# Patient Record
Sex: MALE | Race: OTHER | NOT HISPANIC OR LATINO | URBAN - METROPOLITAN AREA
[De-identification: names, ages, dates, MRNs, and addresses within clinical notes are randomized per-mention and may not be internally consistent; named-entity substitution may affect disease eponyms.]

---

## 2021-10-13 ENCOUNTER — EMERGENCY (EMERGENCY)
Facility: HOSPITAL | Age: 41
LOS: 1 days | Discharge: HOME | End: 2021-10-13
Attending: EMERGENCY MEDICINE | Admitting: EMERGENCY MEDICINE
Payer: MEDICAID

## 2021-10-13 VITALS
OXYGEN SATURATION: 97 % | TEMPERATURE: 98 F | WEIGHT: 175.05 LBS | SYSTOLIC BLOOD PRESSURE: 139 MMHG | RESPIRATION RATE: 20 BRPM | DIASTOLIC BLOOD PRESSURE: 71 MMHG | HEART RATE: 74 BPM

## 2021-10-13 PROCEDURE — 99285 EMERGENCY DEPT VISIT HI MDM: CPT

## 2021-10-13 RX ORDER — SODIUM CHLORIDE 9 MG/ML
1000 INJECTION INTRAMUSCULAR; INTRAVENOUS; SUBCUTANEOUS ONCE
Refills: 0 | Status: COMPLETED | OUTPATIENT
Start: 2021-10-13 | End: 2021-10-13

## 2021-10-13 NOTE — ED ADULT TRIAGE NOTE - CHIEF COMPLAINT QUOTE
35 yr old male c/o left flank pain radiating to the LLQ since today. Pt also reports brown urine since today. Denies NV, fever. FMH of renal cholic. 35 yr old male c/o left flank pain radiating to the LLQ since today. Pt also reports brown urine since today. Denies NV, fever. Took Motrin 400 @ 2230 before arrival to the ED. FMH of renal cholic.

## 2021-10-13 NOTE — ED ADULT NURSE NOTE - OBJECTIVE STATEMENT
Received Pt awake and alert, c/o left flank pain that started last night, worse today, now radiating to LLQ, also c/o dark urine. Pt wife gave Pt ibuprofen with no relief.

## 2021-10-13 NOTE — ED ADULT NURSE NOTE - NSICDXFAMILYHX_GEN_ALL_CORE_FT
FAMILY HISTORY:  Mother  Still living? Yes, Estimated age: Age Unknown  Family history of kidney stones, Age at diagnosis: Age Unknown

## 2021-10-13 NOTE — ED ADULT NURSE NOTE - CHIEF COMPLAINT QUOTE
35 yr old male c/o left flank pain radiating to the LLQ since today. Pt also reports brown urine since today. Denies NV, fever. FMH of renal cholic.

## 2021-10-14 VITALS
SYSTOLIC BLOOD PRESSURE: 138 MMHG | OXYGEN SATURATION: 97 % | TEMPERATURE: 98 F | HEART RATE: 72 BPM | DIASTOLIC BLOOD PRESSURE: 78 MMHG | RESPIRATION RATE: 18 BRPM

## 2021-10-14 LAB
ALBUMIN SERPL ELPH-MCNC: 3.8 G/DL — SIGNIFICANT CHANGE UP (ref 3.3–5)
ALP SERPL-CCNC: 50 U/L — SIGNIFICANT CHANGE UP (ref 40–120)
ALT FLD-CCNC: 102 U/L — HIGH (ref 12–78)
ANION GAP SERPL CALC-SCNC: 3 MMOL/L — LOW (ref 5–17)
APPEARANCE UR: CLEAR — SIGNIFICANT CHANGE UP
AST SERPL-CCNC: 32 U/L — SIGNIFICANT CHANGE UP (ref 15–37)
BACTERIA # UR AUTO: NEGATIVE — SIGNIFICANT CHANGE UP
BASOPHILS # BLD AUTO: 0.03 K/UL — SIGNIFICANT CHANGE UP (ref 0–0.2)
BASOPHILS NFR BLD AUTO: 0.3 % — SIGNIFICANT CHANGE UP (ref 0–2)
BILIRUB SERPL-MCNC: 0.7 MG/DL — SIGNIFICANT CHANGE UP (ref 0.2–1.2)
BILIRUB UR-MCNC: NEGATIVE — SIGNIFICANT CHANGE UP
BUN SERPL-MCNC: 8 MG/DL — SIGNIFICANT CHANGE UP (ref 7–23)
CALCIUM SERPL-MCNC: 8.7 MG/DL — SIGNIFICANT CHANGE UP (ref 8.5–10.1)
CHLORIDE SERPL-SCNC: 108 MMOL/L — SIGNIFICANT CHANGE UP (ref 96–108)
CO2 SERPL-SCNC: 29 MMOL/L — SIGNIFICANT CHANGE UP (ref 22–31)
COLOR SPEC: YELLOW — SIGNIFICANT CHANGE UP
CREAT SERPL-MCNC: 1.2 MG/DL — SIGNIFICANT CHANGE UP (ref 0.5–1.3)
DIFF PNL FLD: ABNORMAL
EOSINOPHIL # BLD AUTO: 0.11 K/UL — SIGNIFICANT CHANGE UP (ref 0–0.5)
EOSINOPHIL NFR BLD AUTO: 1.2 % — SIGNIFICANT CHANGE UP (ref 0–6)
EPI CELLS # UR: NEGATIVE — SIGNIFICANT CHANGE UP
GLUCOSE SERPL-MCNC: 146 MG/DL — HIGH (ref 70–99)
GLUCOSE UR QL: NEGATIVE — SIGNIFICANT CHANGE UP
HCT VFR BLD CALC: 45.2 % — SIGNIFICANT CHANGE UP (ref 39–50)
HGB BLD-MCNC: 14.7 G/DL — SIGNIFICANT CHANGE UP (ref 13–17)
IMM GRANULOCYTES NFR BLD AUTO: 0.3 % — SIGNIFICANT CHANGE UP (ref 0–1.5)
KETONES UR-MCNC: NEGATIVE — SIGNIFICANT CHANGE UP
LEUKOCYTE ESTERASE UR-ACNC: NEGATIVE — SIGNIFICANT CHANGE UP
LYMPHOCYTES # BLD AUTO: 2.18 K/UL — SIGNIFICANT CHANGE UP (ref 1–3.3)
LYMPHOCYTES # BLD AUTO: 23 % — SIGNIFICANT CHANGE UP (ref 13–44)
MCHC RBC-ENTMCNC: 28.6 PG — SIGNIFICANT CHANGE UP (ref 27–34)
MCHC RBC-ENTMCNC: 32.5 GM/DL — SIGNIFICANT CHANGE UP (ref 32–36)
MCV RBC AUTO: 87.9 FL — SIGNIFICANT CHANGE UP (ref 80–100)
MONOCYTES # BLD AUTO: 0.6 K/UL — SIGNIFICANT CHANGE UP (ref 0–0.9)
MONOCYTES NFR BLD AUTO: 6.3 % — SIGNIFICANT CHANGE UP (ref 2–14)
NEUTROPHILS # BLD AUTO: 6.53 K/UL — SIGNIFICANT CHANGE UP (ref 1.8–7.4)
NEUTROPHILS NFR BLD AUTO: 68.9 % — SIGNIFICANT CHANGE UP (ref 43–77)
NITRITE UR-MCNC: NEGATIVE — SIGNIFICANT CHANGE UP
NRBC # BLD: 0 /100 WBCS — SIGNIFICANT CHANGE UP (ref 0–0)
PH UR: 6.5 — SIGNIFICANT CHANGE UP (ref 5–8)
PLATELET # BLD AUTO: 235 K/UL — SIGNIFICANT CHANGE UP (ref 150–400)
POTASSIUM SERPL-MCNC: 4.1 MMOL/L — SIGNIFICANT CHANGE UP (ref 3.5–5.3)
POTASSIUM SERPL-SCNC: 4.1 MMOL/L — SIGNIFICANT CHANGE UP (ref 3.5–5.3)
PROT SERPL-MCNC: 7.8 G/DL — SIGNIFICANT CHANGE UP (ref 6–8.3)
PROT UR-MCNC: NEGATIVE — SIGNIFICANT CHANGE UP
RBC # BLD: 5.14 M/UL — SIGNIFICANT CHANGE UP (ref 4.2–5.8)
RBC # FLD: 13.4 % — SIGNIFICANT CHANGE UP (ref 10.3–14.5)
RBC CASTS # UR COMP ASSIST: SIGNIFICANT CHANGE UP /HPF (ref 0–4)
SODIUM SERPL-SCNC: 140 MMOL/L — SIGNIFICANT CHANGE UP (ref 135–145)
SP GR SPEC: 1.01 — SIGNIFICANT CHANGE UP (ref 1.01–1.02)
UROBILINOGEN FLD QL: NEGATIVE — SIGNIFICANT CHANGE UP
WBC # BLD: 9.48 K/UL — SIGNIFICANT CHANGE UP (ref 3.8–10.5)
WBC # FLD AUTO: 9.48 K/UL — SIGNIFICANT CHANGE UP (ref 3.8–10.5)
WBC UR QL: NEGATIVE — SIGNIFICANT CHANGE UP

## 2021-10-14 PROCEDURE — 99284 EMERGENCY DEPT VISIT MOD MDM: CPT | Mod: 25

## 2021-10-14 PROCEDURE — 80053 COMPREHEN METABOLIC PANEL: CPT

## 2021-10-14 PROCEDURE — 87086 URINE CULTURE/COLONY COUNT: CPT

## 2021-10-14 PROCEDURE — 96374 THER/PROPH/DIAG INJ IV PUSH: CPT

## 2021-10-14 PROCEDURE — 81001 URINALYSIS AUTO W/SCOPE: CPT

## 2021-10-14 PROCEDURE — 36415 COLL VENOUS BLD VENIPUNCTURE: CPT

## 2021-10-14 PROCEDURE — 74176 CT ABD & PELVIS W/O CONTRAST: CPT | Mod: 26,ME

## 2021-10-14 PROCEDURE — 96375 TX/PRO/DX INJ NEW DRUG ADDON: CPT

## 2021-10-14 PROCEDURE — G1004: CPT

## 2021-10-14 PROCEDURE — 74176 CT ABD & PELVIS W/O CONTRAST: CPT | Mod: ME

## 2021-10-14 PROCEDURE — 85025 COMPLETE CBC W/AUTO DIFF WBC: CPT

## 2021-10-14 RX ORDER — MORPHINE SULFATE 50 MG/1
2 CAPSULE, EXTENDED RELEASE ORAL ONCE
Refills: 0 | Status: DISCONTINUED | OUTPATIENT
Start: 2021-10-14 | End: 2021-10-14

## 2021-10-14 RX ORDER — BUDESONIDE AND FORMOTEROL FUMARATE DIHYDRATE 160; 4.5 UG/1; UG/1
2 AEROSOL RESPIRATORY (INHALATION)
Qty: 0 | Refills: 0 | DISCHARGE

## 2021-10-14 RX ORDER — ONDANSETRON 8 MG/1
4 TABLET, FILM COATED ORAL ONCE
Refills: 0 | Status: COMPLETED | OUTPATIENT
Start: 2021-10-14 | End: 2021-10-14

## 2021-10-14 RX ORDER — TAMSULOSIN HYDROCHLORIDE 0.4 MG/1
0.4 CAPSULE ORAL AT BEDTIME
Refills: 0 | Status: DISCONTINUED | OUTPATIENT
Start: 2021-10-14 | End: 2021-10-17

## 2021-10-14 RX ORDER — TAMSULOSIN HYDROCHLORIDE 0.4 MG/1
1 CAPSULE ORAL
Qty: 10 | Refills: 0
Start: 2021-10-14 | End: 2021-10-23

## 2021-10-14 RX ADMIN — MORPHINE SULFATE 2 MILLIGRAM(S): 50 CAPSULE, EXTENDED RELEASE ORAL at 00:10

## 2021-10-14 RX ADMIN — ONDANSETRON 4 MILLIGRAM(S): 8 TABLET, FILM COATED ORAL at 00:10

## 2021-10-14 RX ADMIN — TAMSULOSIN HYDROCHLORIDE 0.4 MILLIGRAM(S): 0.4 CAPSULE ORAL at 02:07

## 2021-10-14 RX ADMIN — SODIUM CHLORIDE 1000 MILLILITER(S): 9 INJECTION INTRAMUSCULAR; INTRAVENOUS; SUBCUTANEOUS at 00:10

## 2021-10-14 NOTE — ED PROVIDER NOTE - CARE PROVIDERS DIRECT ADDRESSES
[email or web address]

## 2021-10-14 NOTE — ED PROVIDER NOTE - NSFOLLOWUPINSTRUCTIONS_ED_ALL_ED_FT
Kidney Stones    WHAT YOU NEED TO KNOW:    Kidney stones form in the urinary system when the water and waste in your urine are out of balance. When this happens, certain types of waste crystals separate from the urine. The crystals build up and form kidney stones. You may have more than one kidney stone.    Kidney Stones          DISCHARGE INSTRUCTIONS:    Return to the emergency department if:   •You are vomiting and it is not relieved with medicine.          Call your doctor or kidney specialist if:   •You have a fever.      •You have trouble urinating.      •You see blood in your urine.      •You have severe pain.      •You have any questions or concerns about your condition or care.      Medicines: You may need any of the following:  •NSAIDs, such as ibuprofen, help decrease swelling, pain, and fever. This medicine is available with or without a doctor's order. NSAIDs can cause stomach bleeding or kidney problems in certain people. If you take blood thinner medicine, always ask your healthcare provider if NSAIDs are safe for you. Always read the medicine label and follow directions.      •Acetaminophen decreases pain and fever. It is available without a doctor's order. Ask how much to take and how often to take it. Follow directions. Read the labels of all other medicines you are using to see if they also contain acetaminophen, or ask your doctor or pharmacist. Acetaminophen can cause liver damage if not taken correctly. Do not use more than 4 grams (4,000 milligrams) total of acetaminophen in one day.       •Prescription pain medicine may be given. Ask your healthcare provider how to take this medicine safely. Some prescription pain medicines contain acetaminophen. Do not take other medicines that contain acetaminophen without talking to your healthcare provider. Too much acetaminophen may cause liver damage. Prescription pain medicine may cause constipation. Ask your healthcare provider how to prevent or treat constipation.       •Medicines to balance your electrolytes may be needed.      •Take your medicine as directed. Contact your healthcare provider if you think your medicine is not helping or if you have side effects. Tell him or her if you are allergic to any medicine. Keep a list of the medicines, vitamins, and herbs you take. Include the amounts, and when and why you take them. Bring the list or the pill bottles to follow-up visits. Carry your medicine list with you in case of an emergency.      What you can do to manage kidney stones:   •Drink more liquids. Your healthcare provider may tell you to drink at least 8 to 12 (eight-ounce) cups of liquids each day. This helps flush out the kidney stones when you urinate. Water is the best liquid to drink.      •Strain your urine every time you go to the bathroom. Urinate through a strainer or a piece of thin cloth to catch the stones. Take the stones to your healthcare provider so they can be sent to the lab for tests. This will help your healthcare providers plan the best treatment for you.  Look for Stones in the Filter           •Eat a variety of healthy foods. Healthy foods include fruits, vegetables, whole-grain breads, low-fat dairy products, beans, and fish. You may need to limit how much sodium (salt) or protein you eat. Ask for information about the best foods for you.  Healthy Foods           •Be physically active as directed. Your stones may pass more easily if you stay active. Physical activity can also help you manage your weight. Ask about the best activities for you.  [employer] Walking for Exercise           After you pass the kidney stones: Your healthcare provider may order a 24-hour urine test. Results from a 24-hour urine test will help your healthcare provider plan ways to prevent more stones from forming. Your healthcare provider will give you more instructions.    Follow up with your doctor or kidney specialist as directed: Write down your questions so you remember to ask them during your visits.

## 2021-10-14 NOTE — ED PROVIDER NOTE - CARE PROVIDER_API CALL
Prieto Louis)  Urology  [address] [place] [address], [address]  [place] [address]  Phone: [phone]  Fax: [phone]  Follow Up Time:

## 2021-10-14 NOTE — ED PROVIDER NOTE - PATIENT PORTAL LINK FT
You can access the FollowMyHealth Patient Portal offered by [facility] by registering at the following website: [email or web address]. By joining [employer]’s FollowMyHealth portal, you will also be able to view your health information using other applications (apps) compatible with our system.

## 2021-10-14 NOTE — ED PROVIDER NOTE - OBJECTIVE STATEMENT
42yo male who presents with flank pain since yesterday. pt c/o left sided flank pain radiating to abdomen, no nausea or vomiting no fever, chills, pt denies hx of kidney stones, pt took 2 motrin with no relief, pt also noticed his urine was brown today

## 2021-10-14 NOTE — ED PROVIDER NOTE - CCCP TRG CHIEF CMPLNT
Provisional Diagnosis:   Schizophrenia per history     Psychosocial and Contextual Factors:   Possible non-compliance with taking prescribed psychotropic medication, pt continues to grieve his mother who  two years ago     [de-identified] Summary:      Patient: X  Family:   Agency: [facility]    Substance Abuse  Denies however is positive for cannabinoid    Present Suicidal Behavior:      Verbal: Denies     Attempt: Denies    Past Suicidal Behavior:     Verbal:  Denies     Attempt:  Denies       Self-Injurious/Self-Mutilation:  Denies     Trauma Identified: Mother  two years ago Leonard Bernabe was my best friend\"      Protective Factors:  Pts father is supportive, pt is linked to outpatient mental health services       Risk Factors:  Possible non-compliance with taking prescribed psychotropic medication, pt continues to grieve his mother who  two years ago, history of inpatient psychiatric treatment       Clinical Summary:  Pt is a 46year old male reportedly escorted to [facility] by officers from [facility] ([address]) due to [employer] nurse said I wasn't acting right\". Pt receives outpatient mental health services at [facility] and picks his psychotropic medication up weekly on  \"she said I wasn't taking my medicine but I am\". Pt denies a history of or current suicidal/homicdial thoughts, denies acute hallucinations, no delusions noted. Pt reportedly is diagnosed with paranoid schizophrenia and state \"I just came out of an episode\". Pt reportedly was recently paranoid and talking to himself however state he lives with his father who intervenes when pt is decompensating Lory Serrano talks to me and bring me out of it\". Pt denies a history of suicide attempts, report a history of two inpatient psychiatric admission (name of facility unknown, dates unknown).   Pt report a legal history of phone harrassment and menacing by stalking, denies drug alcohol use, report loss of sleep, normal appetite, endorse flank pain

## 2021-10-15 LAB
CULTURE RESULTS: NO GROWTH — SIGNIFICANT CHANGE UP
SPECIMEN SOURCE: SIGNIFICANT CHANGE UP

## 2022-02-23 ENCOUNTER — EMERGENCY (EMERGENCY)
Facility: HOSPITAL | Age: 42
LOS: 1 days | Discharge: HOME | End: 2022-02-23
Attending: EMERGENCY MEDICINE | Admitting: EMERGENCY MEDICINE
Payer: MEDICAID

## 2022-02-23 VITALS
HEART RATE: 74 BPM | RESPIRATION RATE: 18 BRPM | SYSTOLIC BLOOD PRESSURE: 150 MMHG | OXYGEN SATURATION: 96 % | DIASTOLIC BLOOD PRESSURE: 94 MMHG | TEMPERATURE: 98 F | WEIGHT: 175.05 LBS

## 2022-02-23 VITALS
HEART RATE: 80 BPM | OXYGEN SATURATION: 98 % | SYSTOLIC BLOOD PRESSURE: 149 MMHG | RESPIRATION RATE: 17 BRPM | DIASTOLIC BLOOD PRESSURE: 90 MMHG | TEMPERATURE: 98 F

## 2022-02-23 PROBLEM — J45.909 UNSPECIFIED ASTHMA, UNCOMPLICATED: Chronic | Status: ACTIVE | Noted: 2021-10-13

## 2022-02-23 LAB
ALBUMIN SERPL ELPH-MCNC: 3.9 G/DL — SIGNIFICANT CHANGE UP (ref 3.3–5)
ALP SERPL-CCNC: 50 U/L — SIGNIFICANT CHANGE UP (ref 40–120)
ALT FLD-CCNC: 117 U/L — HIGH (ref 12–78)
ANION GAP SERPL CALC-SCNC: 7 MMOL/L — SIGNIFICANT CHANGE UP (ref 5–17)
APPEARANCE UR: CLEAR — SIGNIFICANT CHANGE UP
AST SERPL-CCNC: 53 U/L — HIGH (ref 15–37)
BASOPHILS # BLD AUTO: 0.04 K/UL — SIGNIFICANT CHANGE UP (ref 0–0.2)
BASOPHILS NFR BLD AUTO: 0.5 % — SIGNIFICANT CHANGE UP (ref 0–2)
BILIRUB SERPL-MCNC: 1.3 MG/DL — HIGH (ref 0.2–1.2)
BILIRUB UR-MCNC: NEGATIVE — SIGNIFICANT CHANGE UP
BUN SERPL-MCNC: 12 MG/DL — SIGNIFICANT CHANGE UP (ref 7–23)
CALCIUM SERPL-MCNC: 9.3 MG/DL — SIGNIFICANT CHANGE UP (ref 8.5–10.1)
CHLORIDE SERPL-SCNC: 106 MMOL/L — SIGNIFICANT CHANGE UP (ref 96–108)
CO2 SERPL-SCNC: 22 MMOL/L — SIGNIFICANT CHANGE UP (ref 22–31)
COLOR SPEC: YELLOW — SIGNIFICANT CHANGE UP
CREAT SERPL-MCNC: 1.3 MG/DL — SIGNIFICANT CHANGE UP (ref 0.5–1.3)
DIFF PNL FLD: ABNORMAL
EOSINOPHIL # BLD AUTO: 0.12 K/UL — SIGNIFICANT CHANGE UP (ref 0–0.5)
EOSINOPHIL NFR BLD AUTO: 1.4 % — SIGNIFICANT CHANGE UP (ref 0–6)
GLUCOSE SERPL-MCNC: 144 MG/DL — HIGH (ref 70–99)
GLUCOSE UR QL: NEGATIVE — SIGNIFICANT CHANGE UP
HCT VFR BLD CALC: 46.3 % — SIGNIFICANT CHANGE UP (ref 39–50)
HGB BLD-MCNC: 16.1 G/DL — SIGNIFICANT CHANGE UP (ref 13–17)
IMM GRANULOCYTES NFR BLD AUTO: 0.3 % — SIGNIFICANT CHANGE UP (ref 0–1.5)
KETONES UR-MCNC: NEGATIVE — SIGNIFICANT CHANGE UP
LEUKOCYTE ESTERASE UR-ACNC: NEGATIVE — SIGNIFICANT CHANGE UP
LYMPHOCYTES # BLD AUTO: 3.38 K/UL — HIGH (ref 1–3.3)
LYMPHOCYTES # BLD AUTO: 38.7 % — SIGNIFICANT CHANGE UP (ref 13–44)
MCHC RBC-ENTMCNC: 29.8 PG — SIGNIFICANT CHANGE UP (ref 27–34)
MCHC RBC-ENTMCNC: 34.8 GM/DL — SIGNIFICANT CHANGE UP (ref 32–36)
MCV RBC AUTO: 85.7 FL — SIGNIFICANT CHANGE UP (ref 80–100)
MONOCYTES # BLD AUTO: 0.6 K/UL — SIGNIFICANT CHANGE UP (ref 0–0.9)
MONOCYTES NFR BLD AUTO: 6.9 % — SIGNIFICANT CHANGE UP (ref 2–14)
NEUTROPHILS # BLD AUTO: 4.57 K/UL — SIGNIFICANT CHANGE UP (ref 1.8–7.4)
NEUTROPHILS NFR BLD AUTO: 52.2 % — SIGNIFICANT CHANGE UP (ref 43–77)
NITRITE UR-MCNC: NEGATIVE — SIGNIFICANT CHANGE UP
NRBC # BLD: 0 /100 WBCS — SIGNIFICANT CHANGE UP (ref 0–0)
PH UR: 5 — SIGNIFICANT CHANGE UP (ref 5–8)
PLATELET # BLD AUTO: 225 K/UL — SIGNIFICANT CHANGE UP (ref 150–400)
POTASSIUM SERPL-MCNC: 4.5 MMOL/L — SIGNIFICANT CHANGE UP (ref 3.5–5.3)
POTASSIUM SERPL-SCNC: 4.5 MMOL/L — SIGNIFICANT CHANGE UP (ref 3.5–5.3)
PROT SERPL-MCNC: 7.8 G/DL — SIGNIFICANT CHANGE UP (ref 6–8.3)
PROT UR-MCNC: 15
RBC # BLD: 5.4 M/UL — SIGNIFICANT CHANGE UP (ref 4.2–5.8)
RBC # FLD: 13.2 % — SIGNIFICANT CHANGE UP (ref 10.3–14.5)
SODIUM SERPL-SCNC: 135 MMOL/L — SIGNIFICANT CHANGE UP (ref 135–145)
SP GR SPEC: 1.02 — SIGNIFICANT CHANGE UP (ref 1.01–1.02)
UROBILINOGEN FLD QL: NEGATIVE — SIGNIFICANT CHANGE UP
WBC # BLD: 8.74 K/UL — SIGNIFICANT CHANGE UP (ref 3.8–10.5)
WBC # FLD AUTO: 8.74 K/UL — SIGNIFICANT CHANGE UP (ref 3.8–10.5)

## 2022-02-23 PROCEDURE — 81001 URINALYSIS AUTO W/SCOPE: CPT

## 2022-02-23 PROCEDURE — 99284 EMERGENCY DEPT VISIT MOD MDM: CPT | Mod: 25

## 2022-02-23 PROCEDURE — 74176 CT ABD & PELVIS W/O CONTRAST: CPT | Mod: 26,MA

## 2022-02-23 PROCEDURE — 87086 URINE CULTURE/COLONY COUNT: CPT

## 2022-02-23 PROCEDURE — 96374 THER/PROPH/DIAG INJ IV PUSH: CPT

## 2022-02-23 PROCEDURE — 80053 COMPREHEN METABOLIC PANEL: CPT

## 2022-02-23 PROCEDURE — 99285 EMERGENCY DEPT VISIT HI MDM: CPT

## 2022-02-23 PROCEDURE — 85025 COMPLETE CBC W/AUTO DIFF WBC: CPT

## 2022-02-23 PROCEDURE — 96375 TX/PRO/DX INJ NEW DRUG ADDON: CPT

## 2022-02-23 PROCEDURE — 74176 CT ABD & PELVIS W/O CONTRAST: CPT | Mod: MA

## 2022-02-23 PROCEDURE — 36415 COLL VENOUS BLD VENIPUNCTURE: CPT

## 2022-02-23 RX ORDER — MORPHINE SULFATE 50 MG/1
4 CAPSULE, EXTENDED RELEASE ORAL ONCE
Refills: 0 | Status: DISCONTINUED | OUTPATIENT
Start: 2022-02-23 | End: 2022-02-23

## 2022-02-23 RX ORDER — KETOROLAC TROMETHAMINE 30 MG/ML
15 SYRINGE (ML) INJECTION ONCE
Refills: 0 | Status: DISCONTINUED | OUTPATIENT
Start: 2022-02-23 | End: 2022-02-23

## 2022-02-23 RX ORDER — TAMSULOSIN HYDROCHLORIDE 0.4 MG/1
1 CAPSULE ORAL
Qty: 10 | Refills: 0
Start: 2022-02-23 | End: 2022-03-04

## 2022-02-23 RX ORDER — SODIUM CHLORIDE 9 MG/ML
1000 INJECTION INTRAMUSCULAR; INTRAVENOUS; SUBCUTANEOUS ONCE
Refills: 0 | Status: COMPLETED | OUTPATIENT
Start: 2022-02-23 | End: 2022-02-23

## 2022-02-23 RX ORDER — TAMSULOSIN HYDROCHLORIDE 0.4 MG/1
0.4 CAPSULE ORAL ONCE
Refills: 0 | Status: COMPLETED | OUTPATIENT
Start: 2022-02-23 | End: 2022-02-23

## 2022-02-23 RX ORDER — ONDANSETRON 8 MG/1
4 TABLET, FILM COATED ORAL ONCE
Refills: 0 | Status: COMPLETED | OUTPATIENT
Start: 2022-02-23 | End: 2022-02-23

## 2022-02-23 RX ORDER — IBUPROFEN 200 MG
1 TABLET ORAL
Qty: 28 | Refills: 0
Start: 2022-02-23 | End: 2022-03-01

## 2022-02-23 RX ADMIN — ONDANSETRON 4 MILLIGRAM(S): 8 TABLET, FILM COATED ORAL at 11:30

## 2022-02-23 RX ADMIN — Medication 15 MILLIGRAM(S): at 13:58

## 2022-02-23 RX ADMIN — SODIUM CHLORIDE 1000 MILLILITER(S): 9 INJECTION INTRAMUSCULAR; INTRAVENOUS; SUBCUTANEOUS at 11:30

## 2022-02-23 RX ADMIN — MORPHINE SULFATE 4 MILLIGRAM(S): 50 CAPSULE, EXTENDED RELEASE ORAL at 11:30

## 2022-02-23 RX ADMIN — TAMSULOSIN HYDROCHLORIDE 0.4 MILLIGRAM(S): 0.4 CAPSULE ORAL at 13:58

## 2022-02-23 NOTE — ED PROVIDER NOTE - OBJECTIVE STATEMENT
Pt is a 40 yo male with pmhx of asthma and kidney stones here for intermittent worsening left flank pain for a few days no nv + chills no fever no hematuria + dark urine. Pt states had stone on same side. Pt states had some left over oxycodone which he took for pain.      pcp Missy

## 2022-02-23 NOTE — ED PROVIDER NOTE - PATIENT PORTAL LINK FT
You can access the FollowMyHealth Patient Portal offered by [facility] by registering at the following website: [email or web address]. By joining [email or web address]’s FollowMyHealth portal, you will also be able to view your health information using other applications (apps) compatible with our system.

## 2022-02-23 NOTE — ED PROVIDER NOTE - CARE PROVIDER_API CALL
Dayton Cunningham)  Urology  [address], [address]  [place] [address]  Phone: [phone]  Fax: [phone]  Follow Up Time: 4-6 Days

## 2022-02-23 NOTE — ED ADULT NURSE NOTE - SUICIDE SCREENING QUESTION 3
81 y/o male with PMHx DM, HTN, HLD, etoh abuse, now admitted for fall (presumed mechanical fall 2/2 to alcoholic intake) , rib fractures    IMPROVE VTE Individual Risk Assessment          RISK                                                          Points    [  ] Previous VTE                                                3    [  ] Thrombophilia                                             2    [  ] Lower limb paralysis                                   2        (unable to hold up >15 seconds)      [  ] Current Cancer                                             2         (within 6 months)    [  ] Immobilization > 24 hrs                              1    [  ] ICU/CCU stay > 24 hours                            1    [ x ] Age > 60                                                        1    IMPROVE VTE Score _____1____
No

## 2022-02-23 NOTE — ED PROVIDER NOTE - PROGRESS NOTE DETAILS
pain not improved. ct showing 5mm stone with mild hydroureteronephrosis. toradol and flomax ordered. pending UA. ua neg for uti. +hematuria.   Reevaluated patient at bedside.  Patient feeling well. Discussed the results of all diagnostic testing in ED and copies of all reports given.   An opportunity to ask questions was given.  Discussed the importance of prompt, close medical follow-up.  Patient will return with any changes, concerns or persistent / worsening symptoms.  Understanding of all instructions verbalized. dc w pmd and gu f/u. flomax nightly and nsaids prn. Based on the H&P, I do not suspect any life- / limb- threatening pathology that requires further intervention at this time.

## 2022-02-23 NOTE — ED ADULT NURSE NOTE - OBJECTIVE STATEMENT
Pt a/ox4, reports 10/10 constant right flank pain.  Pt reports difficulty urinating and some hematuria. No other signs of acute distress noted.

## 2022-02-24 LAB
CULTURE RESULTS: NO GROWTH — SIGNIFICANT CHANGE UP
SPECIMEN SOURCE: SIGNIFICANT CHANGE UP

## 2022-08-01 NOTE — ED PROVIDER NOTE - ATTENDING CONTRIBUTION TO CARE
38 yo M pmh nephrolithiasis p/w left flank pain x days and chills. denies fever, hematuria, dysuris    vss  +L cvat    will get labs, ua, ns bolus, ct stone hunt, analgesia, reassess 38 yo M pmh nephrolithiasis p/w left flank pain x days and chills. denies fever, hematuria, dysuris    vss  +L cvat    will get labs, ua, ns bolus, ct stone hunt, analgesia, reassess  consider nephrolithiasis, uti, msk pain. same name as above

## 2023-08-02 NOTE — ED ADULT NURSE NOTE - CAS EDN DISCHARGE ASSESSMENT
[Initial Visit ___] : an initial visit for [unfilled] [Questionnaire Received] : Patient questionnaire received [Recurrent Urinary Infections] : recurrent urinary infections [Urine Frequency] : urine frequency [Urinary Urgency] : urinary urgency [Painful Urination] : painful urination Alert and oriented to person, place and time

## 2024-09-05 NOTE — ED ADULT NURSE NOTE - CADM POA URETHRAL CATHETER
Lab Results   Component Value Date    HGBA1C 6.8 (H) 07/05/2024       Recent Labs     09/04/24  1632 09/04/24 2053 09/05/24  0625 09/05/24  1055   POCGLU 111 148* 137 108         Blood Sugar Average: Last 72 hrs:  (P) 129.5    Follows with wound care as outpatient  Was seen by podiatry during recent hospitalization  Continue local wound care  Follow up with wound and podiatry   No

## 2024-09-11 ENCOUNTER — EMERGENCY (EMERGENCY)
Facility: HOSPITAL | Age: 44
LOS: 1 days | Discharge: HOME | End: 2024-09-11
Attending: STUDENT IN AN ORGANIZED HEALTH CARE EDUCATION/TRAINING PROGRAM | Admitting: STUDENT IN AN ORGANIZED HEALTH CARE EDUCATION/TRAINING PROGRAM
Payer: COMMERCIAL

## 2024-09-11 VITALS
HEART RATE: 89 BPM | RESPIRATION RATE: 18 BRPM | SYSTOLIC BLOOD PRESSURE: 146 MMHG | DIASTOLIC BLOOD PRESSURE: 91 MMHG | OXYGEN SATURATION: 98 %

## 2024-09-11 VITALS
HEART RATE: 107 BPM | SYSTOLIC BLOOD PRESSURE: 127 MMHG | DIASTOLIC BLOOD PRESSURE: 9 MMHG | HEIGHT: 68 IN | TEMPERATURE: 98 F | RESPIRATION RATE: 18 BRPM | OXYGEN SATURATION: 97 % | WEIGHT: 175.05 LBS

## 2024-09-11 PROCEDURE — 73610 X-RAY EXAM OF ANKLE: CPT | Mod: 26,LT

## 2024-09-11 PROCEDURE — 73610 X-RAY EXAM OF ANKLE: CPT

## 2024-09-11 PROCEDURE — 99284 EMERGENCY DEPT VISIT MOD MDM: CPT

## 2024-09-11 PROCEDURE — 73620 X-RAY EXAM OF FOOT: CPT | Mod: 26,LT

## 2024-09-11 PROCEDURE — 73620 X-RAY EXAM OF FOOT: CPT

## 2024-09-11 RX ORDER — ACETAMINOPHEN 325 MG/1
975 TABLET ORAL ONCE
Refills: 0 | Status: COMPLETED | OUTPATIENT
Start: 2024-09-11 | End: 2024-09-11

## 2024-09-11 RX ADMIN — ACETAMINOPHEN 975 MILLIGRAM(S): 325 TABLET ORAL at 10:23

## 2024-09-11 NOTE — ED PROVIDER NOTE - CARE PROVIDER_API CALL
Sridhar Santana  Podiatric Medicine and Surgery  [address] [place] [address]  Phone: [phone]  Fax: [phone]  Follow Up Time: 1-3 Days

## 2024-09-11 NOTE — ED PROVIDER NOTE - NSFOLLOWUPINSTRUCTIONS_ED_ALL_ED_FT
Please follow up with your Primary Care Physician and any specialists as discussed- Podiatry.  You can take acetaminophen (Tylenol) for your pain. It is available over the counter. You can take up to 1 gram (three 325mg tablets or two 500mg tablets) every 4-6 hours as needed.    You can also take an NSAID (non-steroidal anti-inflammatory drug) such as ibuprofen (Motrin, Advil), or naproxen (Aleve) for your pain. These are available over the counter. You can take 3 tablets of ibuprofen (200mg each) every 6 hours or 2 tablets of naproxen (220mg each) every 12 hours. Do no mix NSAIDs such as ibuprofen, diclofenac, ketorolac, and naproxen. Please take as needed and please take with food.    You can alternate acetaminophen and a NSAID to help further with pain.   If your symptoms persist or worsen, please seek care. Either return to the Emergency Department, go to urgent care or see your primary care doctor.  Please refer to general information and instructions attached or below:     Ankle Pain  The ankle joint helps you stand on your leg and allows you to move around. Ankle pain can happen on either side or the back of the ankle. You may have pain in one ankle or both ankles. Ankle pain may be sharp and burning or dull and aching. There may be tenderness, stiffness, redness, or warmth around the ankle.    Many things can cause ankle pain. These include an injury to the area and overuse of your ankle.    Follow these instructions at home:  Activity    Rest your ankle as told by your health care provider. Avoid doing things that cause ankle pain.  Do not use the injured limb to support your body weight until your provider says that you can. Use crutches as told by your provider.  Ask your provider when it is safe to drive if you have a brace on your ankle.  Do exercises as told by your provider.  If you have a removable brace:    Wear the brace as told by your provider. Remove it only as told by your provider.  Check the skin around the brace every day. Tell your provider about any concerns.  Loosen the brace if your toes tingle, become numb, or turn cold and blue.  Keep the brace clean.  If the brace is not waterproof:  Do not let it get wet.  Cover it with a watertight covering when you take a bath or shower.  If you have an elastic bandage:    A person wrapping a bandage around an ankle.  Remove it when you take a bath or a shower.  Try not to move your ankle much. Wiggle your toes from time to time. This helps to prevent swelling.  Adjust the bandage if it feels too tight.  Loosen the bandage if your foot tingles, becomes numb, or turns cold and blue.  Managing pain, stiffness, and swelling    Bag of ice on a towel on the skin.  If told, put ice on the painful area.  If you have a removable brace or elastic bandage, remove it as told by your provider.  Put ice in a plastic bag.  Place a towel between your skin and the bag.  Leave the ice on for 20 minutes, 2–3 times a day.  If your skin turns bright red, remove the ice right away to prevent skin damage. The risk of damage is higher if you cannot feel pain, heat, or cold.  Move your toes often to reduce stiffness and swelling.  Raise (elevate) your ankle above the level of your heart while you are sitting or lying down.  General instructions    Take over-the-counter and prescription medicines only as told by your provider.  To help you and your provider, write down:  How often you have ankle pain.  Where the pain is.  What the pain feels like.  If you are told to wear a certain shoe or insole, make sure you wear it the right way and for as long as you are told.  Contact a health care provider if:  Your pain gets worse.  Your pain does not get better with medicine.  You have a fever or chills.  You have more trouble walking.  You have new symptoms.  Your foot, leg, toes, or ankle tingles, becomes numb or swollen, or turns cold and blue.  This information is not intended to replace advice given to you by your health care provider. Make sure you discuss any questions you have with your health care provider.

## 2024-09-11 NOTE — ED PROVIDER NOTE - CLINICAL SUMMARY MEDICAL DECISION MAKING FREE TEXT BOX
here with foot in ankle pain in setting of recent injury. differential diagnosis inclusive of strain/sprain, atypical gout, doubt septic / gonococcal arthritis. doubt fracture. will get XR.

## 2024-09-11 NOTE — ED PROVIDER NOTE - PHYSICAL EXAMINATION
Vital signs as available reviewed.  General:  No acute distress.  Head:  Normocephalic, atraumatic.  Eyes:  Conjunctiva pink, no icterus.  Musculoskeletal:  left lower extremity with mild warmth around ankle with out erythema. + tenderness to palpation medial malleoli, anterior mid foot and base of 5th toe. + tenderness to palpation along achilles tendon.  Neurologic: Alert and oriented, moving all extremities.  Skin:  Warm and dry.

## 2024-09-11 NOTE — ED ADULT NURSE NOTE - NSFALLCONCLUSION_ED_ALL_ED
Universal Safety Interventions Drysol Pregnancy And Lactation Text: This medication is considered safe during pregnancy and breast feeding.

## 2024-09-11 NOTE — ED PROVIDER NOTE - INTERNATIONAL TRAVEL
Unclear etiology  Possibly due leg swelling  Rule out DVT  Plan  US DVT  Diurese  Control pain with morphine and tylenol PRN     No

## 2024-09-11 NOTE — ED PROVIDER NOTE - PATIENT PORTAL LINK FT
You can access the FollowMyHealth Patient Portal offered by [facility] by registering at the following website: [email or web address]. By joining [employer]’s FollowMyHealth portal, you will also be able to view your health information using other applications (apps) compatible with our system.

## 2024-09-11 NOTE — ED ADULT NURSE NOTE - OBJECTIVE STATEMENT
Patient states he woke up this morning with sudden onset left foot pain.  He states he injured it a few months ago, but denies recent injury.  Patient is awake and alert, appears slightly anxious.  Foot is normotensive, pedal pulses palpable.  patient states he took 600 mg. Motrin at home before coming here.

## 2024-09-11 NOTE — ED ADULT TRIAGE NOTE - CHIEF COMPLAINT QUOTE
I woke up this morning with severe pain in my left foot.  I had no recent injury, but two months ago I did twist it.  I had pain for two days after that twisting injury, but then I was fine.   I can't walk because of the pain.  (ambulated into triage).( I did just start amoxicillin yesterday for an unrelated condition- sinus infection)

## 2024-09-26 PROBLEM — Z00.00 ENCOUNTER FOR PREVENTIVE HEALTH EXAMINATION: Status: ACTIVE | Noted: 2024-09-26

## 2024-10-09 ENCOUNTER — APPOINTMENT (OUTPATIENT)
Dept: OTOLARYNGOLOGY | Facility: CLINIC | Age: 44
End: 2024-10-09

## 2025-01-23 ENCOUNTER — APPOINTMENT (OUTPATIENT)
Dept: INTERNAL MEDICINE | Facility: CLINIC | Age: 45
End: 2025-01-23

## 2025-01-29 ENCOUNTER — NON-APPOINTMENT (OUTPATIENT)
Age: 45
End: 2025-01-29

## 2025-02-21 NOTE — ED PROVIDER NOTE - SKIN, MLM
RE: Plan of Care    Patrick Corrigan MD    Thank you for referring Luc Sorensen. The following information reflects my assessment and plan of care.    Please review and sign the attached form to indicate your approval of the plan of care. Insurance compliance requires your approval be on this plan of care. After your review, please fax back all pages received. Should you have any questions, feel free to contact me.    Letha Miller, PT  [facility]  [address] [place] [address]  Phone: [phone]  Fax: [phone]           Plan of Care 25   Effective from: 2025  Effective to: 2025    Plan ID: [id number]            Participants as of Finalize on 2025    Name Type Comments Contact Info    Anabella Waite DO Referring Provider  [phone]    Letha Miller PT Physical Therapist             Luc Sorensen MRN:[id number] (:1946 78 year old M)             Evaluation     Author: Letha Miller, PT Status: Signed Last edited: 2025  8:45 AM       Physical Therapy Progress Note    Visit Type: Progress Note  Visit: 18  Referring Provider: Patrick Corrigan MD  Medical Diagnosis (from order): 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  He states that he is working on adjustments currently with his Prosthetist.  Functional Change: Walk 100 feet  Stand for 20 minutes  Perform all transfers  Current Functional Limitations: Perform stairs  Walk >100 feet  Carrying objects  Lifting objects      Pain / Symptoms  - Pain rating (out of 10): Current: 0 ; Best: 0; Worst: 4     OBJECTIVE                                                                                                                       Strength  (out of 5 unless noted, standard test position unless noted)   Hip:    - Flexion:         Left: 4+         Right: 4-    - Abduction:         Left: 4         Right: 4-    - Adduction:         Left: 4         Right: 3+          Balance Tests   5 Times Sit to Stand (seconds) 16    Norms: 70-79 year old - 4.5 -15.5 sec    Interpretation:        > 12 seconds indicates need for further assessment for fall risk for community dwelling elderly      > 16 seconds indicative of falls risk for Parkinson's disease  Timed Up and Go (TUG)      - Total time to complete: 38 sec, stable on turns     - Assistive device: wheeled walker and gait belt used    Interpretation: < 10 seconds = normal; > or = 12 seconds is a positive screen for fall risk based on     CDC STEADI tool kit; > or = 13.5 seconds has been shown to indicate high risk for falls     high risk of falls    Bed Mobility  - Supine to sit: modified independent  - Sit to supine: modified independent  Transfers  Assistive devices: 2-wheeled walker  - Sit to stand: modified independent  - Stand to sit: modified independent     Ambulation / Gait  - Assistive device: 2-wheeled walker  - Distance (feet unless otherwise indicated): 150, 150, 150  - Assist Level: stand by assist  - Surface: even  - Description: decreased mercedes/pace, forward flexed at hips and heavy reliance on BUE    Gait Analysis   - Trunk/Pelvis/UE: forward lean  - RLE: decreased hip extension      Outcome/Assessments  Outcome Measures:   Neuro QOL - Lower Extremity Function (Mobility) - Short Form  Lower Extremity Raw Score: 27, Lower Extremity T Score: 36.7  (Scored 8-40, lower score indicates increased disability) see flowsheet for additional documentation       Treatment     Therapeutic Exercise  Not performed today:  SUPINE EXERCISES OMITTED AT PATIENT'S REQUEST DUE TO PAIN AFTER SESSIONS  Supine hamstring stretch 3 reps x 60 seconds   Supine piriformis stretch 2  reps x 60 seconds  Active assist range of motion straight leg raise- 2 sets x 15 reps   Supine flexion and extension ROM left leg 1x20     Neuromuscular Re-Education  Standing weight shifting in parallel bars bars: x30 reps each  Standing balance rhomberg stance 2 reps x 30 seconds  Sit to stand:  2x15    Not performed:   Standing hip swing R lower extremity with bilateral upper extremity support- 2 sets x 15 reps  Standing hip abduction with bilateral upper extremity support- 2 sets x 15 reps each  Standing step taps in bars 4\": x20 reps each - not performed 1/27    Planned:  Educated on efficiency and technique of donning/doffing pants x1   Shoulder external rotation blue theraband 2x10  Shoulder horizontal abduction blue theraband 2x10  Banded triceps extension blue theraband 2x10  Banded bicep curls blue theraband 2x10  Shoulder row blue theraband 2x10           Gait Training  Ambulation in // bars with bilateral upper extremity assist:  8  Laps in parallel bars  - velocity changes  - turning (both left and right)  - alternating which foot leading - notable toe in    Not performed:  - 4 point pattern with lofstrand crutches in parallel bars with emphasis on increased neutral trunk posturing and sequencing  - 4 point pattern utilizing parallel bars utilizing forearm crutches- 5 feet x 7 reps with rest breaks  -Forward and backward walking in parallel bars - 20 laps total - frequent rest breaks   -Facilitated ambulation with rolling walker x 50 feet x 6 on various surfaces with emphasis on equal step length and appropriate hip extension of R lower extremity to promote increased efficiency of prosthesis during the gait cycle.    Skilled input: verbal instruction/cues, tactile instruction/cues and posture correction    Writer verbally educated and received verbal consent for hand placement, positioning of patient, and techniques to be performed today from patient for clothing adjustments for techniques, therapist  position for techniques and hand placement and palpation for techniques as described above and how they are pertinent to the patient's plan of care.  Home Exercise Program  Access Code: [email or web address]  URL: [email or web address]  Date: 12/23/2024  Prepared by: Dulce Santo    Exercises  - Side to Side Weight Shift with Counter Support  - 1 x daily - 7 x weekly - 3 sets - 10 reps  - Tandem Stance with Support  - 1 x daily - 7 x weekly - 1 sets - 1 reps - 60 hold  - Sit to Stand with Counter Support  - 1 x daily - 7 x weekly - 3 sets - 10 reps  - Heel Raises with Counter Support  - 1 x daily - 7 x weekly - 3 sets - 10 reps  - Forward Step Up  - 1 x daily - 7 x weekly - 3 sets - 10 reps  - Lateral Step Down  - 1 x daily - 7 x weekly - 3 sets - 10 reps      ASSESSMENT                                                                                                            Gains in skilled therapy to date as expected in overall strength, balance, and proprioception, with recent decline in standing and ambulation ability due to pistoning of prosthetic limb that he is currently addressing with his Prosthetist.  He would benefit from further Physical Therapy sessions to address impairments and progress towards goals.  Patient attends therapy as recommended.  Patient reports performing HEP as prescribed.  Progress toward discharge/long term goals (see below for specific status updates): good progress  Medically necessary skilled therapy interventions continue to be required to allow the patient to maximize their functional abilities as noted above.  Pain/symptoms after session (out of 10): 0  Education:   - Results of above outlined education: Verbalizes understanding and Demonstrates understanding    PLAN                                                                                                                          Extend frequency and duration per below.  Frequency / Duration  3 times per week  tapering as patient progresses for 10 weeks for an estimated total of 20 visits    Suggestions for next session as indicated: Progress per plan of care  Progression with ambulation challenges as per adjustment of prosthetic    Goals  Long Term Goals: to be met by end of plan of care  1. The patient will demonstrate the ability to independently ambulate a flight of stairs, utilizing upper extremity assist as needed, without a loss of balance, to aid with safe navigation of stairs at home and in the community.      Status: Progressing towards goal  2. The patient will report the ability to independently ambulate 500 meters without a loss of balance, to aid with safe navigation of the community.     Status:Progressing towards goal  3. The patient will demonstrate the ability to independently ambulate 50 feet without a loss of balance, to facilitate improved safety while navigating household distances.       Status: Progressing towards goal  4. The patient will demonstrate independent performance of home exercise program.      Status: Progressing towards goal      Therapy procedure time and total treatment time can be found documented on the Time Entry flowsheet           Current Participants as of 2/21/2025    Name Type Comments Contact Info    Anabella Waite DO Referring Provider  [phone]    Signature pending    Letha Miller PT Physical Therapist      Electronically signed by Letha Miller PT at 2/21/2025 4602 CST          RE: Plan of Care for Luc Sorensen, YOB: 1946     I certify the need for these services, furnished under this plan of treatment and while under my care.  I agree with the plan of care as stated and request that therapy proceed.        __________________________________________________________________________________  Provider Signature         Date    Skin normal color for race, warm, dry and intact.